# Patient Record
Sex: MALE | Race: WHITE | Employment: UNEMPLOYED | URBAN - METROPOLITAN AREA
[De-identification: names, ages, dates, MRNs, and addresses within clinical notes are randomized per-mention and may not be internally consistent; named-entity substitution may affect disease eponyms.]

---

## 2020-11-15 ENCOUNTER — HOSPITAL ENCOUNTER (EMERGENCY)
Age: 50
Discharge: HOME | End: 2020-11-15
Attending: EMERGENCY MEDICINE

## 2020-11-15 ENCOUNTER — APPOINTMENT (OUTPATIENT)
Dept: GENERAL RADIOLOGY | Age: 50
End: 2020-11-15
Attending: EMERGENCY MEDICINE

## 2020-11-15 VITALS
DIASTOLIC BLOOD PRESSURE: 82 MMHG | RESPIRATION RATE: 18 BRPM | OXYGEN SATURATION: 97 % | SYSTOLIC BLOOD PRESSURE: 127 MMHG | HEART RATE: 66 BPM | TEMPERATURE: 98.1 F

## 2020-11-15 DIAGNOSIS — M54.31 SCIATICA OF RIGHT SIDE: ICD-10-CM

## 2020-11-15 DIAGNOSIS — M51.36 DDD (DEGENERATIVE DISC DISEASE), LUMBAR: Primary | ICD-10-CM

## 2020-11-15 PROCEDURE — 72100 X-RAY EXAM L-S SPINE 2/3 VWS: CPT

## 2020-11-15 PROCEDURE — 74011250637 HC RX REV CODE- 250/637: Performed by: PHYSICIAN ASSISTANT

## 2020-11-15 PROCEDURE — 99284 EMERGENCY DEPT VISIT MOD MDM: CPT

## 2020-11-15 RX ORDER — KETOROLAC TROMETHAMINE 10 MG/1
10 TABLET, FILM COATED ORAL
Status: COMPLETED | OUTPATIENT
Start: 2020-11-15 | End: 2020-11-15

## 2020-11-15 RX ORDER — OXYCODONE AND ACETAMINOPHEN 7.5; 325 MG/1; MG/1
1 TABLET ORAL
Status: COMPLETED | OUTPATIENT
Start: 2020-11-15 | End: 2020-11-15

## 2020-11-15 RX ORDER — METAXALONE 800 MG/1
800 TABLET ORAL
Qty: 20 TAB | Refills: 0 | Status: SHIPPED | OUTPATIENT
Start: 2020-11-15 | End: 2020-11-20

## 2020-11-15 RX ORDER — DEXAMETHASONE SODIUM PHOSPHATE 100 MG/10ML
10 INJECTION INTRAMUSCULAR; INTRAVENOUS
Status: COMPLETED | OUTPATIENT
Start: 2020-11-15 | End: 2020-11-15

## 2020-11-15 RX ORDER — ONDANSETRON 4 MG/1
4 TABLET, ORALLY DISINTEGRATING ORAL
Status: COMPLETED | OUTPATIENT
Start: 2020-11-15 | End: 2020-11-15

## 2020-11-15 RX ORDER — LIDOCAINE 50 MG/G
PATCH TOPICAL
Qty: 30 EACH | Refills: 0 | Status: SHIPPED | OUTPATIENT
Start: 2020-11-15

## 2020-11-15 RX ORDER — DIAZEPAM 5 MG/1
5 TABLET ORAL
Status: COMPLETED | OUTPATIENT
Start: 2020-11-15 | End: 2020-11-15

## 2020-11-15 RX ORDER — PREDNISONE 10 MG/1
10 TABLET ORAL
Qty: 45 TAB | Refills: 0 | Status: SHIPPED | OUTPATIENT
Start: 2020-11-15

## 2020-11-15 RX ORDER — TRAMADOL HYDROCHLORIDE 50 MG/1
50 TABLET ORAL
Qty: 18 TAB | Refills: 0 | Status: SHIPPED | OUTPATIENT
Start: 2020-11-15 | End: 2020-11-18

## 2020-11-15 RX ADMIN — DIAZEPAM 5 MG: 5 TABLET ORAL at 12:33

## 2020-11-15 RX ADMIN — ONDANSETRON 4 MG: 4 TABLET, ORALLY DISINTEGRATING ORAL at 12:33

## 2020-11-15 RX ADMIN — OXYCODONE HYDROCHLORIDE AND ACETAMINOPHEN 1 TABLET: 7.5; 325 TABLET ORAL at 12:33

## 2020-11-15 RX ADMIN — DEXAMETHASONE SODIUM PHOSPHATE 10 MG: 10 INJECTION INTRAMUSCULAR; INTRAVENOUS at 12:33

## 2020-11-15 RX ADMIN — KETOROLAC TROMETHAMINE 10 MG: 10 TABLET, FILM COATED ORAL at 12:33

## 2020-11-15 NOTE — LETTER
[facility] EMERGENCY DEPT 
[address] [facility] 
[phone] Work/School Note Date: 11/15/2020 To Whom It May concern: Naga Roth was seen and treated today in the emergency room by the following provider(s): 
Attending Provider: Molinda Cooks, MD 
Physician Assistant: RALEIGH Teran. Naga Roth may return to work on 11/18/20. Sincerely, RALEIGH Mayorga

## 2020-11-15 NOTE — ED PROVIDER NOTES
Patient is here with lower back pain that started yesterday when he was hanging sheet rock up on a ladder and twisted. The pain runs down in his right leg to his foot. There is no weakness or swelling of the leg. He is not having any loss or retention of his urine or bowels. No fever. No chest pain, shortness of breath, abdominal pain, dizziness, weakness, dyspnea on exertion, orthopnea, fever or other new symptoms. His wife drove him in. He did ambulate to the room without difficulty and is well-hydrated. He states he has a history of arthritis and it will flareup from time to time. He has attempted over-the-counter ibuprofen, Tylenol and one of his wife's hydrocodone's without relief. The history is provided by the patient. Back Pain    This is a recurrent problem. The current episode started yesterday. The problem has been gradually worsening. The problem occurs constantly. The pain is associated with twisting. The pain is present in the lumbar spine. The quality of the pain is described as aching. The pain radiates to the right foot. The pain is at a severity of 8/10. The pain is moderate. The symptoms are aggravated by certain positions and twisting. Associated symptoms include leg pain. Pertinent negatives include no chest pain, no fever, no numbness, no weight loss, no headaches, no abdominal pain, no abdominal swelling, no bowel incontinence, no perianal numbness, no bladder incontinence, no dysuria, no pelvic pain, no paresthesias, no paresis, no tingling and no weakness. He has tried NSAIDs for the symptoms. No past medical history on file. No past surgical history on file. No family history on file.     Social History     Socioeconomic History    Marital status: SINGLE     Spouse name: Not on file    Number of children: Not on file    Years of education: Not on file    Highest education level: Not on file   Occupational History    Not on file   Social Needs    Financial resource strain: Not on file    Food insecurity     Worry: Not on file     Inability: Not on file    Transportation needs     Medical: Not on file     Non-medical: Not on file   Tobacco Use    Smoking status: Current Every Day Smoker   Substance and Sexual Activity    Alcohol use: No    Drug use: Not on file    Sexual activity: Not on file   Lifestyle    Physical activity     Days per week: Not on file     Minutes per session: Not on file    Stress: Not on file   Relationships    Social connections     Talks on phone: Not on file     Gets together: Not on file     Attends [employer] service: Not on file     Active member of club or organization: Not on file     Attends meetings of clubs or organizations: Not on file     Relationship status: Not on file    Intimate partner violence     Fear of current or ex partner: Not on file     Emotionally abused: Not on file     Physically abused: Not on file     Forced sexual activity: Not on file   Other Topics Concern    Not on file   Social History Narrative    Not on file         ALLERGIES: Patient has no known allergies. Review of Systems   Constitutional: Negative. Negative for fever and weight loss. HENT: Negative. Eyes: Negative. Respiratory: Negative. Cardiovascular: Negative. Negative for chest pain. Gastrointestinal: Negative. Negative for abdominal pain and bowel incontinence. Genitourinary: Negative. Negative for bladder incontinence, dysuria and pelvic pain. Musculoskeletal: Positive for back pain. Right leg pain   Skin: Negative. Neurological: Negative. Negative for tingling, weakness, numbness, headaches and paresthesias. Psychiatric/Behavioral: Negative. All other systems reviewed and are negative. Vitals:    11/15/20 1217 11/15/20 1315   BP: (!) 144/91 127/82   Pulse: 86 66   Resp: 16 18   Temp: 98.1 °F (36.7 °C)    SpO2: 97% 97%            Physical Exam  Vitals signs and nursing note reviewed. Constitutional:       Appearance: He is well-developed. HENT:      Head: Normocephalic and atraumatic. Right Ear: External ear normal.      Left Ear: External ear normal.      Nose: Nose normal.   Eyes:      Conjunctiva/sclera: Conjunctivae normal.      Pupils: Pupils are equal, round, and reactive to light. Neck:      Musculoskeletal: Normal range of motion and neck supple. Cardiovascular:      Rate and Rhythm: Normal rate and regular rhythm. Heart sounds: Normal heart sounds. Pulmonary:      Effort: Pulmonary effort is normal.      Breath sounds: Normal breath sounds. Abdominal:      General: Bowel sounds are normal.      Palpations: Abdomen is soft. Musculoskeletal: Normal range of motion. Back:    Skin:     General: Skin is warm and dry. Neurological:      General: No focal deficit present. Mental Status: He is alert and oriented to person, place, and time. GCS: GCS eye subscore is 4. GCS verbal subscore is 5. GCS motor subscore is 6. Cranial Nerves: Cranial nerves are intact. Sensory: Sensation is intact. Motor: Motor function is intact. Coordination: Coordination is intact. Gait: Gait is intact. Deep Tendon Reflexes: Reflexes are normal and symmetric. Reflex Scores:       Tricep reflexes are 2+ on the right side and 2+ on the left side. Bicep reflexes are 2+ on the right side and 2+ on the left side. Brachioradialis reflexes are 2+ on the right side and 2+ on the left side. Patellar reflexes are 2+ on the right side and 2+ on the left side. Achilles reflexes are 2+ on the right side and 2+ on the left side. Psychiatric:         Behavior: Behavior normal.         Thought Content:  Thought content normal.         Judgment: Judgment normal.          MDM  Number of Diagnoses or Management Options  DDD (degenerative disc disease), lumbar:   Sciatica of right side:      Amount and/or Complexity of Data Reviewed  Tests in the radiology section of CPT®: reviewed    Risk of Complications, Morbidity, and/or Mortality  Presenting problems: moderate  Diagnostic procedures: moderate  Management options: moderate    Patient Progress  Patient progress: improved         Procedures    The patient was observed in the ED. Results Reviewed:  XR SPINE LUMB 2 OR 3 V   Final Result   IMPRESSION: Multilevel degenerative change. No evidence of fracture or other   acute abnormality in the lumbar spine. I will treat patient for lumbar strain/DDD/sciatica and have him use warm, moist heat to area, massage, gentle range of motion and stretching to area. Use the medication as directed, ED if worse. I will refer to orthopedics for follow up. Also, follow up with PCP for recheck. There are no urgent or emergent signs or symptoms today that would indicate an emergent need for further evaluation in the ER. He was encouraged to return if any of these symptoms begin. He expressed understanding. He is stable for discharge and ambulatory out of the ED without difficulty at this time. I discussed the results of all labs, procedures, radiographs, and treatments with the patient and available family. Treatment plan is agreed upon and the patient is ready for discharge. All voiced understanding of the discharge plan and medication instructions or changes as appropriate. Questions about treatment in the ED were answered. All were encouraged to return should symptoms worsen or new problems develop.

## 2020-11-15 NOTE — ED TRIAGE NOTES
Pt ambulatory to triage. Pt states he has hx of back issues and states he was hanging sheet rock in basement and when turned the lower back began hurting and radiates to right leg.

## 2020-11-15 NOTE — ED NOTES
I have reviewed discharge instructions with the patient. The patient verbalized understanding. Patient left ED via Discharge Method: ambulatory to Home with (insert name of family/friend, self, transport WIFE). Opportunity for questions and clarification provided. Patient given 4 scripts. To continue your aftercare when you leave the hospital, you may receive an automated call from our care team to check in on how you are doing.  This is a free service and part of our promise to provide the best care and service to meet your aftercare needs. \" If you have questions, or wish to unsubscribe from this service please call [phone].  Thank you for Choosing our [facility] Emergency Department.

## 2020-11-15 NOTE — DISCHARGE INSTRUCTIONS
Patient Education        Learning About Relief for Back Pain  What is back tension and strain? Back strain happens when you overstretch, or pull, a muscle in your back. You may hurt your back in an accident or when you exercise or lift something. Most back pain will get better with rest and time. You can take care of yourself at home to help your back heal.  What can you do first to relieve back pain? When you first feel back pain, try these steps:  · Walk. Take a short walk (10 to 20 minutes) on a level surface (no slopes, hills, or stairs) every 2 to 3 hours. Walk only distances you can manage without pain, especially leg pain. · Relax. Find a comfortable position for rest. Some people are comfortable on the floor or a medium-firm bed with a small pillow under their head and another under their knees. Some people prefer to lie on their side with a pillow between their knees. Don't stay in one position for too long. · Try heat or ice. Try using a heating pad on a low or medium setting, or take a warm shower, for 15 to 20 minutes every 2 to 3 hours. Or you can buy single-use heat wraps that last up to 8 hours. You can also try an ice pack for 10 to 15 minutes every 2 to 3 hours. You can use an ice pack or a bag of frozen vegetables wrapped in a thin towel. There is not strong evidence that either heat or ice will help, but you can try them to see if they help. You may also want to try switching between heat and cold. · Take pain medicine exactly as directed. ¨ If the doctor gave you a prescription medicine for pain, take it as prescribed. ¨ If you are not taking a prescription pain medicine, ask your doctor if you can take an over-the-counter medicine. What else can you do? · Stretch and exercise. Exercises that increase flexibility may relieve your pain and make it easier for your muscles to keep your spine in a good, neutral position. And don't forget to keep walking. · Do self-massage.  You can use self-massage to unwind after work or school or to energize yourself in the morning. You can easily massage your feet, hands, or neck. Self-massage works best if you are in comfortable clothes and are sitting or lying in a comfortable position. Use oil or lotion to massage bare skin. · Reduce stress. Back pain can lead to a vicious [place]: Distress about the pain tenses the muscles in your back, which in turn causes more pain. Learn how to relax your mind and your muscles to lower your stress. Where can you learn more? Go to http://www.gray.com/. Enter X058 in the search box to learn more about \"Learning About Relief for Back Pain. \"  Current as of: March 21, 2017  Content Version: 11.5  © 2166-8878 [employer]. Care instructions adapted under license by [employer] (which disclaims liability or warranty for this information). If you have questions about a medical condition or this instruction, always ask your healthcare professional. [place] [address] any warranty or liability for your use of this information. Patient Education        Sciatica: Care Instructions  Your Care Instructions     Sciatica (say \"rqu-XG-uy-kuh\") is an irritation of one of the sciatic nerves, which come from the spinal cord in the lower back. The sciatic nerves and their branches extend down through the buttock to the foot. Sciatica can develop when an injured disc in the back irritates or presses against a spinal nerve root. Its main symptom is pain, numbness, or weakness that is often worse in the leg or foot than in the back. Sciatica often will improve and go away with time. Early treatment usually includes medicines and exercises to relieve pain. Follow-up care is a key part of your treatment and safety. Be sure to make and go to all appointments, and call your doctor if you are having problems.  It's also a good idea to know your test results and keep a list of the medicines you take. How can you care for yourself at home? · Take pain medicines exactly as directed. ? If the doctor gave you a prescription medicine for pain, take it as prescribed. ? If you are not taking a prescription pain medicine, ask your doctor if you can take an over-the-counter medicine. · Use heat or ice to relieve pain. ? To apply heat, put a warm water bottle, heating pad set on low, or warm cloth on your back. Do not go to sleep with a heating pad on your skin. ? To use ice, put ice or a cold pack on the area for 10 to 20 minutes at a time. Put a thin cloth between the ice and your skin. · Avoid sitting if possible, unless it feels better than standing. · Alternate lying down with short walks. Increase your walking distance as you are able to without making your symptoms worse. · Do not do anything that makes your symptoms worse. When should you call for help? Call 911 anytime you think you may need emergency care. For example, call if:    · You are unable to move a leg at all. Call your doctor now or seek immediate medical care if:    · You have new or worse symptoms in your legs or buttocks. Symptoms may include:  ? Numbness or tingling. ? Weakness. ? Pain.     · You lose bladder or bowel control. Watch closely for changes in your health, and be sure to contact your doctor if:    · You are not getting better as expected. Where can you learn more? Go to http://www.gray.com/  Enter Z239 in the search box to learn more about \"Sciatica: Care Instructions. \"  Current as of: March 2, 2020               Content Version: 12.6  © 5451-2413 [employer]. Care instructions adapted under license by [employer] (which disclaims liability or warranty for this information).  If you have questions about a medical condition or this instruction, always ask your healthcare professional. Adrianne Humphries disclaims any warranty or liability for your use of this information. Patient Education        Sciatica: Exercises  Introduction  Here are some examples of typical rehabilitation exercises for your condition. Start each exercise slowly. Ease off the exercise if you start to have pain. Your doctor or physical therapist will tell you when you can start these exercises and which ones will work best for you. When you are not being active, find a comfortable position for rest. Some people are comfortable on the floor or a medium-firm bed with a small pillow under their head and another under their knees. Some people prefer to lie on their side with a pillow between their knees. Don't stay in one position for too long. Take short walks (10 to 20 minutes) every 2 to 3 hours. Avoid slopes, hills, and stairs until you feel better. Walk only distances you can manage without pain, especially leg pain. How to do the exercises  Back stretches   1. Get down on your hands and knees on the floor. 2. Relax your head and allow it to droop. Round your back up toward the ceiling until you feel a nice stretch in your upper, middle, and lower back. Hold this stretch for as long as it feels comfortable, or about 15 to 30 seconds. 3. Return to the starting position with a flat back while you are on your hands and knees. 4. Let your back sway by pressing your stomach toward the floor. Lift your buttocks toward the ceiling. 5. Hold this position for 15 to 30 seconds. 6. Repeat 2 to 4 times. Follow-up care is a key part of your treatment and safety. Be sure to make and go to all appointments, and call your doctor if you are having problems. It's also a good idea to know your test results and keep a list of the medicines you take. Where can you learn more? Go to http://www.gray.com/  Enter D435 in the search box to learn more about \"Sciatica: Exercises. \"  Current as of: March 2, 2020               Content Version: 12.6  © 9936-6548 [employer]. Care instructions adapted under license by [employer] (which disclaims liability or warranty for this information). If you have questions about a medical condition or this instruction, always ask your healthcare professional. [place] [address] any warranty or liability for your use of this information.